# Patient Record
Sex: MALE | Race: WHITE | NOT HISPANIC OR LATINO | Employment: FULL TIME | URBAN - METROPOLITAN AREA
[De-identification: names, ages, dates, MRNs, and addresses within clinical notes are randomized per-mention and may not be internally consistent; named-entity substitution may affect disease eponyms.]

---

## 2023-10-02 ENCOUNTER — OFFICE VISIT (OUTPATIENT)
Dept: FAMILY MEDICINE CLINIC | Facility: CLINIC | Age: 53
End: 2023-10-02

## 2023-10-02 VITALS
RESPIRATION RATE: 18 BRPM | TEMPERATURE: 97.3 F | OXYGEN SATURATION: 98 % | BODY MASS INDEX: 38.41 KG/M2 | HEART RATE: 88 BPM | DIASTOLIC BLOOD PRESSURE: 78 MMHG | SYSTOLIC BLOOD PRESSURE: 128 MMHG | WEIGHT: 283.6 LBS | HEIGHT: 72 IN

## 2023-10-02 DIAGNOSIS — R73.03 PREDIABETES: ICD-10-CM

## 2023-10-02 DIAGNOSIS — R20.0 NUMBNESS AND TINGLING IN BOTH HANDS: Primary | ICD-10-CM

## 2023-10-02 DIAGNOSIS — R20.2 NUMBNESS AND TINGLING OF BOTH FEET: ICD-10-CM

## 2023-10-02 DIAGNOSIS — R20.2 NUMBNESS AND TINGLING IN BOTH HANDS: Primary | ICD-10-CM

## 2023-10-02 DIAGNOSIS — R20.0 NUMBNESS AND TINGLING OF BOTH FEET: ICD-10-CM

## 2023-10-02 LAB
EXPIRATION DATE: ABNORMAL
HBA1C MFR BLD: 6.3 %
Lab: ABNORMAL

## 2023-10-02 NOTE — PROGRESS NOTES
"Subjective   Han Vidales is a 53 y.o. male.     History of Present Illness   Patient presents to [facility] of numbness and tingling in tips of fingers and toes over the past year  Worse on L side   Works as . Does a lot work with hands   Notes all fingers are affected.   Feels off balance with tingling in feet. Feels like moving up feet.     No hx anemia or blood sugar concerns that he is aware of but admits to not seeing a medical provider in several years. Does not currently have health insurance where he is part time. Hoping to go to full time soon     R hand dominant     No significant family hx     80 percent standing at work   Wears steel toed boots at work. Current pair is 3 years old  No issues with fit.     No joint issues. Neck and low back have been feeling okay     Occasionally goes to chiropractor for neck adjustments that help. Been awhile since needed to do this.      The following portions of the patient's history were reviewed and updated as appropriate: allergies, current medications, past family history, past medical history, past social history, past surgical history, and problem list.    Review of Systems  As noted per HPI     Objective   Blood pressure 128/78, pulse 88, temperature 97.3 °F (36.3 °C), resp. rate 18, height 182.9 cm (72\"), weight 129 kg (283 lb 9.6 oz), SpO2 98 %.   Physical Exam  Vitals and nursing note reviewed.   Constitutional:       Appearance: Normal appearance.   HENT:      Head: Normocephalic.      Right Ear: Tympanic membrane, ear canal and external ear normal.      Left Ear: Tympanic membrane, ear canal and external ear normal.      Nose: No congestion or rhinorrhea.      Mouth/Throat:      Pharynx: No oropharyngeal exudate or posterior oropharyngeal erythema.   Cardiovascular:      Rate and Rhythm: Normal rate and regular rhythm.   Pulmonary:      Effort: Pulmonary effort is normal.      Breath sounds: Normal breath sounds.   Musculoskeletal:         " General: No swelling, tenderness or deformity. Normal range of motion.   Skin:     General: Skin is warm and dry.   Neurological:      Mental Status: He is alert and oriented to person, place, and time.   Psychiatric:         Mood and Affect: Mood normal.         Behavior: Behavior normal.         Thought Content: Thought content normal.         Judgment: Judgment normal.       Assessment & Plan   Diagnoses and all orders for this visit:    1. Numbness and tingling in both hands (Primary)  -     POC Glycosylated Hemoglobin (Hb A1C)    2. Numbness and tingling of both feet  -     POC Glycosylated Hemoglobin (Hb A1C)    3. Prediabetes    A1c in office is in prediabetic range. Discussed this with patient. He has been working on cutting back on pop and eating better. This may be contributing to some of his sensation changes. Recommend magnesium and b12 supplement OTC   Pt will follow up if symptoms are not improving or when he has active insurance for further work up .     Coded at a lower level due to lack of medical insurance

## 2024-01-08 ENCOUNTER — NURSE TRIAGE (OUTPATIENT)
Dept: CALL CENTER | Facility: HOSPITAL | Age: 54
End: 2024-01-08
Payer: COMMERCIAL

## 2024-01-08 NOTE — TELEPHONE ENCOUNTER
"Hub-  This  occurred six months - He has had numbness in the hands and feet are cold. He saw the office 6 months ago- He did not have insurance coverage - He has insurance  now-  Conference with the office- Speaking with the office now. Declined triage.   Reason for Disposition   [1] Caller requests to speak ONLY to PCP AND [2] URGENT question    Additional Information   Negative: Lab calling with strep throat test results and triager can call in prescription   Negative: Lab calling with urinalysis test results and triager can call in prescription   Negative: Medication questions   Negative: Medication renewal and refill questions   Negative: Pre-operative or pre-procedural questions   Negative: ED call to PCP (i.e., primary care provider; doctor, NP, or PA)   Negative: Doctor (or NP/PA) call to PCP   Negative: Call about patient who is currently hospitalized   Negative: Lab or radiology calling with CRITICAL test results   Negative: [1] Follow-up call from patient regarding patient's clinical status AND [2] information urgent    Answer Assessment - Initial Assessment Questions  1. REASON FOR CALL or QUESTION: \"What is your reason for calling today?\" or \"How can I best  help you?\" or \"What question do you have that I can help answer?\"      Office visit   2. CALLER: Document the source of call. (e.g., laboratory, patient).      Wife.    Protocols used: PCP Call - No Triage-ADULT-    "

## 2024-01-10 ENCOUNTER — OFFICE VISIT (OUTPATIENT)
Dept: FAMILY MEDICINE CLINIC | Facility: CLINIC | Age: 54
End: 2024-01-10
Payer: COMMERCIAL

## 2024-01-10 VITALS
WEIGHT: 271.6 LBS | HEIGHT: 72 IN | HEART RATE: 78 BPM | TEMPERATURE: 97.5 F | DIASTOLIC BLOOD PRESSURE: 62 MMHG | BODY MASS INDEX: 36.79 KG/M2 | SYSTOLIC BLOOD PRESSURE: 122 MMHG | RESPIRATION RATE: 18 BRPM | OXYGEN SATURATION: 99 %

## 2024-01-10 DIAGNOSIS — Z13.6 ENCOUNTER FOR LIPID SCREENING FOR CARDIOVASCULAR DISEASE: ICD-10-CM

## 2024-01-10 DIAGNOSIS — F17.219 CIGARETTE NICOTINE DEPENDENCE WITH NICOTINE-INDUCED DISORDER: ICD-10-CM

## 2024-01-10 DIAGNOSIS — Z13.220 ENCOUNTER FOR LIPID SCREENING FOR CARDIOVASCULAR DISEASE: ICD-10-CM

## 2024-01-10 DIAGNOSIS — Z12.5 SCREENING FOR MALIGNANT NEOPLASM OF PROSTATE: ICD-10-CM

## 2024-01-10 DIAGNOSIS — R20.0 NUMBNESS AND TINGLING OF BOTH FEET: ICD-10-CM

## 2024-01-10 DIAGNOSIS — M79.10 MYALGIA: ICD-10-CM

## 2024-01-10 DIAGNOSIS — Z00.00 ENCOUNTER FOR WELL ADULT EXAM WITHOUT ABNORMAL FINDINGS: Primary | ICD-10-CM

## 2024-01-10 DIAGNOSIS — R20.0 NUMBNESS AND TINGLING IN BOTH HANDS: ICD-10-CM

## 2024-01-10 DIAGNOSIS — Z11.59 NEED FOR HEPATITIS C SCREENING TEST: ICD-10-CM

## 2024-01-10 DIAGNOSIS — E55.9 VITAMIN D DEFICIENCY: ICD-10-CM

## 2024-01-10 DIAGNOSIS — R20.2 NUMBNESS AND TINGLING OF BOTH FEET: ICD-10-CM

## 2024-01-10 DIAGNOSIS — R20.2 NUMBNESS AND TINGLING IN BOTH HANDS: ICD-10-CM

## 2024-01-10 PROCEDURE — 99396 PREV VISIT EST AGE 40-64: CPT | Performed by: PHYSICIAN ASSISTANT

## 2024-01-15 ENCOUNTER — NURSE TRIAGE (OUTPATIENT)
Dept: CALL CENTER | Facility: HOSPITAL | Age: 54
End: 2024-01-15
Payer: COMMERCIAL

## 2024-01-15 NOTE — TELEPHONE ENCOUNTER
[facility] calling with critical WBC of 112.20. Read back. SALVADOR Márquez on call at [facility] notified of result.  SALVADOR Lyon asks that patient be called and see if he is running a fever.  Recommends going to the ED whether febrile or not.  Patient contacted and denies fevers but will go to the ED as recommended by on call provider.

## 2024-01-15 NOTE — TELEPHONE ENCOUNTER
"Reason for Disposition  • Lab or radiology calling with CRITICAL test results    Additional Information  • Negative: Lab calling with strep throat test results and triager can call in prescription  • Negative: Lab calling with urinalysis test results and triager can call in prescription  • Negative: Medication questions  • Negative: Medication renewal and refill questions  • Negative: Pre-operative or pre-procedural questions  • Negative: ED call to PCP (i.e., primary care provider; doctor, NP, or PA)  • Negative: Doctor (or NP/PA) call to PCP  • Negative: Call about patient who is currently hospitalized    Answer Assessment - Initial Assessment Questions  1. REASON FOR CALL or QUESTION: \"What is your reason for calling today?\" or \"How can I best  help you?\" or \"What question do you have that I can help answer?\"      Critical lab  2. CALLER: Document the source of call. (e.g., laboratory, patient).      [facility]    Protocols used: PCP Call - No Triage-ADULT-    "

## 2024-01-16 ENCOUNTER — OFFICE VISIT (OUTPATIENT)
Dept: FAMILY MEDICINE CLINIC | Facility: CLINIC | Age: 54
End: 2024-01-16
Payer: COMMERCIAL

## 2024-01-16 VITALS
BODY MASS INDEX: 36.35 KG/M2 | HEART RATE: 76 BPM | WEIGHT: 268.4 LBS | HEIGHT: 72 IN | SYSTOLIC BLOOD PRESSURE: 118 MMHG | RESPIRATION RATE: 20 BRPM | OXYGEN SATURATION: 98 % | DIASTOLIC BLOOD PRESSURE: 64 MMHG | TEMPERATURE: 97.8 F

## 2024-01-16 DIAGNOSIS — D72.9 NEUTROPHILIA: ICD-10-CM

## 2024-01-16 DIAGNOSIS — D72.829 LEUKOCYTOSIS, UNSPECIFIED TYPE: Primary | ICD-10-CM

## 2024-01-16 PROCEDURE — 99213 OFFICE O/P EST LOW 20 MIN: CPT | Performed by: PHYSICIAN ASSISTANT

## 2024-01-16 RX ORDER — VITAMIN B COMPLEX
CAPSULE ORAL DAILY
COMMUNITY

## 2024-01-16 RX ORDER — MAGNESIUM OXIDE 400 MG/1
400 TABLET ORAL DAILY
COMMUNITY

## 2024-01-16 RX ORDER — MULTIPLE VITAMINS W/ MINERALS TAB 9MG-400MCG
1 TAB ORAL DAILY
COMMUNITY

## 2024-01-16 RX ORDER — MULTIVIT WITH MINERALS/LUTEIN
250 TABLET ORAL DAILY
COMMUNITY

## 2024-01-16 NOTE — PROGRESS NOTES
"Subjective   Han Vidales is a 53 y.o. male.     History of Present Illness   Pt presents for annual exam   Notes since last visit, he now has health insurance   Would like to proceed with additional evaluation of numbness and tingling in hands and feet  numbness and tingling in tips of fingers and toes over the past year  Worse on L side   Works as . Does a lot work with hands   Notes all fingers are affected.   Feels off balance with tingling in feet. Feels like moving up feet.     Sometimes gets muscle aches in lower legs  Pt's partner concerned about his circulation. Feet will be cold at times      No hx anemia or blood sugar concerns that he is aware of but admits to not seeing a medical provider in several years.      R hand dominant      No significant family hx      80 percent standing at work   Wears steel toed boots at work. Current pair is 3 years old  No issues with fit.      No joint issues. Neck and low back have been feeling okay     Pt current 0.5 ppd smoker     Last visit labs showed he was prediabetic. Directed to work on some nutrition changes to help with blood sugar control     No hx of colon cancer screening. Pt will think about. No known family hx of colon cancer  Does not wish for any updated vaccinations at this time      The following portions of the patient's history were reviewed and updated as appropriate: allergies, current medications, past family history, past medical history, past social history, past surgical history, and problem list.    Review of Systems  As noted per HPI     Objective   Blood pressure 122/62, pulse 78, temperature 97.5 °F (36.4 °C), resp. rate 18, height 182.9 cm (72\"), weight 123 kg (271 lb 9.6 oz), SpO2 99%.     Physical Exam  Vitals and nursing note reviewed.   Constitutional:       Appearance: Normal appearance. He is well-developed.   HENT:      Head: Normocephalic and atraumatic.      Right Ear: Tympanic membrane, ear canal and external ear normal.    "   Left Ear: Tympanic membrane, ear canal and external ear normal.      Nose: Nose normal.      Mouth/Throat:      Pharynx: No oropharyngeal exudate.   Eyes:      Conjunctiva/sclera: Conjunctivae normal.   Neck:      Thyroid: No thyromegaly.      Trachea: No tracheal deviation.   Cardiovascular:      Rate and Rhythm: Normal rate and regular rhythm.      Heart sounds: Normal heart sounds.   Pulmonary:      Effort: Pulmonary effort is normal. No respiratory distress.      Breath sounds: Normal breath sounds. No wheezing or rales.   Chest:      Chest wall: No tenderness.   Abdominal:      General: Bowel sounds are normal. There is no distension.      Palpations: Abdomen is soft. There is no mass.      Tenderness: There is no abdominal tenderness. There is no guarding or rebound.      Hernia: No hernia is present.   Musculoskeletal:      Cervical back: Normal range of motion and neck supple.   Lymphadenopathy:      Cervical: No cervical adenopathy.   Skin:     General: Skin is warm and dry.   Neurological:      Mental Status: He is alert and oriented to person, place, and time.   Psychiatric:         Mood and Affect: Mood normal.         Behavior: Behavior normal.         Thought Content: Thought content normal.         Judgment: Judgment normal.         Assessment & Plan   Diagnoses and all orders for this visit:    1. Encounter for well adult exam without abnormal findings (Primary)  -     CBC w AUTO Differential  -     Comprehensive metabolic panel  -     Iron Profile  -     Vitamin D 25 hydroxy  -     Lipid Panel  -     TSH  -     T4, free  -     Hemoglobin A1c  -     Magnesium  -     Vitamin B12  -     Folate  -     CK  -     NIKOLE  -     Sedimentation rate, automated  -     C-reactive protein  -     Testosterone  -     PSA SCREENING  -     Hepatitis C antibody  -     Lyme Disease Total Antibody With Reflex to Immunoassay    2. Numbness and tingling in both hands  -     CBC w AUTO Differential  -     Comprehensive  metabolic panel  -     Iron Profile  -     TSH  -     T4, free  -     Hemoglobin A1c  -     Magnesium  -     Vitamin B12  -     Folate  -     EMG & Nerve Conduction Test  -     Lyme Disease Total Antibody With Reflex to Immunoassay    3. Numbness and tingling of both feet  -     CBC w AUTO Differential  -     Comprehensive metabolic panel  -     Iron Profile  -     TSH  -     T4, free  -     Hemoglobin A1c  -     Magnesium  -     Vitamin B12  -     Folate  -     EMG & Nerve Conduction Test  -     Lyme Disease Total Antibody With Reflex to Immunoassay    4. Cigarette nicotine dependence with nicotine-induced disorder    5. Myalgia  -     CBC w AUTO Differential  -     Comprehensive metabolic panel  -     CK  -     NIKOLE  -     Sedimentation rate, automated  -     C-reactive protein    6. Vitamin D deficiency  -     Vitamin D 25 hydroxy    7. Encounter for lipid screening for cardiovascular disease  -     Lipid Panel    8. Screening for malignant neoplasm of prostate  -     PSA SCREENING    9. Need for hepatitis C screening test  -     Hepatitis C antibody      Detailed lab panel as outlined in plan   nerve conduction study ordered for ongoing numbness and tingling of upper and lower extremities     Counseling was given to patient and family for the following topics: instructions for management, risk factor reductions, patient and family education, and impressions . Total time of the encounter was 15 minutes and 7.5 minutes was spent counseling.

## 2024-01-16 NOTE — PROGRESS NOTES
"Subjective   Han Vidales is a 53 y.o. male.     History of Present Illness   Pt received phone call from on call provider about elevated WBC count that was critical. WBC was 112.2, neutrophil count 84.15 and absolute basophil count 2.24.   Labs still pending. What could be pulled also noted elevated CRP and sed rate.   Only symptoms have been numbness and tingling in hands and feet for the past year  Pt is current smoker   Due to extremely high white blood cell count, on call provider recommended ER evaluation but patient did not wish to go until he further discussed with provider     The following portions of the patient's history were reviewed and updated as appropriate: allergies, current medications, past family history, past medical history, past social history, past surgical history, and problem list.    Objective   Blood pressure 118/64, pulse 76, temperature 97.8 °F (36.6 °C), resp. rate 20, height 182.9 cm (72\"), weight 122 kg (268 lb 6.4 oz), SpO2 98%.   Physical Exam  Constitutional:       Appearance: Normal appearance.   Cardiovascular:      Rate and Rhythm: Normal rate.   Pulmonary:      Effort: Pulmonary effort is normal.   Neurological:      Mental Status: He is alert and oriented to person, place, and time.   Psychiatric:         Mood and Affect: Mood normal.         Behavior: Behavior normal.         Assessment & Plan   Diagnoses and all orders for this visit:    1. Leukocytosis, unspecified type (Primary)  -     Peripheral Blood Smear  -     Anti-neutrophilic Cytoplasmic Antibody  -     Rheumatoid Factor  -     Hepatitis panel, acute  -     Cortisol  -     Ambulatory Referral to Hematology    2. Neutrophilia  -     Peripheral Blood Smear  -     Anti-neutrophilic Cytoplasmic Antibody  -     Rheumatoid Factor  -     Hepatitis panel, acute  -     Cortisol  -     Ambulatory Referral to Hematology    Check additional lab markers as noted while awaiting on initial labs to return   Urgent referral to " hematology due to significant abnormalities noted on CBC   Etiologies could include inflammation, infection or malignancy   Patient is not showing any evidence these values are from an acute infection.   Advise to report to ER if any new or worrisome symptoms develop while working on getting him evaluated with hematology

## 2024-01-17 ENCOUNTER — TELEPHONE (OUTPATIENT)
Dept: FAMILY MEDICINE CLINIC | Facility: CLINIC | Age: 54
End: 2024-01-17
Payer: COMMERCIAL

## 2024-01-17 LAB
25(OH)D3+25(OH)D2 SERPL-MCNC: 34.9 NG/ML (ref 30–100)
ALBUMIN SERPL-MCNC: 4.7 G/DL (ref 3.5–5.2)
ALBUMIN/GLOB SERPL: 2 G/DL
ALP SERPL-CCNC: 78 U/L (ref 39–117)
ALT SERPL-CCNC: 26 U/L (ref 1–41)
ANA SER QL: POSITIVE
AST SERPL-CCNC: 34 U/L (ref 1–40)
B BURGDOR IGG+IGM SER QL IA: NEGATIVE
BASOPHILS # BLD AUTO: ABNORMAL 10*3/UL
BASOPHILS # BLD MANUAL: 2.24 10*3/MM3 (ref 0–0.2)
BASOPHILS NFR BLD MANUAL: 2 % (ref 0–1.5)
BILIRUB SERPL-MCNC: 0.5 MG/DL (ref 0–1.2)
BUN SERPL-MCNC: 15 MG/DL (ref 6–20)
BUN/CREAT SERPL: 12 (ref 7–25)
CALCIUM SERPL-MCNC: 9.6 MG/DL (ref 8.6–10.5)
CHLORIDE SERPL-SCNC: 101 MMOL/L (ref 98–107)
CHOLEST SERPL-MCNC: 166 MG/DL (ref 0–200)
CK SERPL-CCNC: 85 U/L (ref 20–200)
CO2 SERPL-SCNC: 24.5 MMOL/L (ref 22–29)
CREAT SERPL-MCNC: 1.25 MG/DL (ref 0.76–1.27)
CRP SERPL-MCNC: 0.65 MG/DL (ref 0–0.5)
DIFFERENTIAL COMMENT: ABNORMAL
DSDNA AB SER-ACNC: <1 IU/ML (ref 0–9)
EGFRCR SERPLBLD CKD-EPI 2021: 68.4 ML/MIN/1.73
EOSINOPHIL # BLD AUTO: ABNORMAL 10*3/UL
EOSINOPHIL # BLD MANUAL: 0 10*3/MM3 (ref 0–0.4)
EOSINOPHIL NFR BLD AUTO: ABNORMAL %
EOSINOPHIL NFR BLD MANUAL: 0 % (ref 0.3–6.2)
ERYTHROCYTE [DISTWIDTH] IN BLOOD BY AUTOMATED COUNT: 16.5 % (ref 12.3–15.4)
ERYTHROCYTE [SEDIMENTATION RATE] IN BLOOD BY WESTERGREN METHOD: 39 MM/HR (ref 0–20)
FOLATE SERPL-MCNC: 15.7 NG/ML (ref 4.78–24.2)
GLOBULIN SER CALC-MCNC: 2.3 GM/DL
GLUCOSE SERPL-MCNC: 100 MG/DL (ref 65–99)
HBA1C MFR BLD: 5.9 % (ref 4.8–5.6)
HCT VFR BLD AUTO: 39.5 % (ref 37.5–51)
HCV IGG SERPL QL IA: NON REACTIVE
HDLC SERPL-MCNC: 20 MG/DL (ref 40–60)
HGB BLD-MCNC: 12.7 G/DL (ref 13–17.7)
IRON SATN MFR SERPL: 22 % (ref 20–50)
IRON SERPL-MCNC: 88 MCG/DL (ref 59–158)
LDLC SERPL CALC-MCNC: 93 MG/DL (ref 0–100)
LYMPHOCYTES # BLD AUTO: ABNORMAL 10*3/UL
LYMPHOCYTES # BLD MANUAL: 2.24 10*3/MM3 (ref 0.7–3.1)
LYMPHOCYTES NFR BLD AUTO: ABNORMAL %
LYMPHOCYTES NFR BLD MANUAL: 2 % (ref 19.6–45.3)
Lab: NORMAL
MAGNESIUM SERPL-MCNC: 2.4 MG/DL (ref 1.6–2.6)
MCH RBC QN AUTO: 28.9 PG (ref 26.6–33)
MCHC RBC AUTO-ENTMCNC: 32.2 G/DL (ref 31.5–35.7)
MCV RBC AUTO: 89.8 FL (ref 79–97)
MONOCYTES # BLD MANUAL: 0 10*3/MM3 (ref 0.1–0.9)
MONOCYTES NFR BLD AUTO: ABNORMAL %
MONOCYTES NFR BLD MANUAL: 0 % (ref 5–12)
NEUTROPHILS # BLD MANUAL: 84.15 10*3/MM3 (ref 1.7–7)
NEUTROPHILS NFR BLD AUTO: ABNORMAL %
NEUTROPHILS NFR BLD MANUAL: 75 % (ref 42.7–76)
PLATELET # BLD AUTO: 151 10*3/MM3 (ref 140–450)
PLATELET BLD QL SMEAR: ABNORMAL
POTASSIUM SERPL-SCNC: 4.1 MMOL/L (ref 3.5–5.2)
PROT SERPL-MCNC: 7 G/DL (ref 6–8.5)
PSA SERPL-MCNC: 0.59 NG/ML (ref 0–4)
RBC # BLD AUTO: 4.4 10*6/MM3 (ref 4.14–5.8)
RBC MORPH BLD: ABNORMAL
SODIUM SERPL-SCNC: 139 MMOL/L (ref 136–145)
T4 FREE SERPL-MCNC: 1.15 NG/DL (ref 0.93–1.7)
TESTOST SERPL-MCNC: 382 NG/DL (ref 264–916)
TIBC SERPL-MCNC: 399 MCG/DL
TRIGL SERPL-MCNC: 316 MG/DL (ref 0–150)
TSH SERPL DL<=0.005 MIU/L-ACNC: 1.95 UIU/ML (ref 0.27–4.2)
UIBC SERPL-MCNC: 311 MCG/DL (ref 112–346)
VIT B12 SERPL-MCNC: >2000 PG/ML (ref 211–946)
VLDLC SERPL CALC-MCNC: 53 MG/DL (ref 5–40)
WBC # BLD AUTO: 112.2 10*3/MM3 (ref 3.4–10.8)

## 2024-01-18 LAB
BASOPHILS # BLD AUTO: 3.3 X10E3/UL (ref 0–0.2)
BASOPHILS NFR BLD AUTO: 3 %
BLASTS NFR BLD: 1 % (ref 0–0)
C-ANCA TITR SER IF: ABNORMAL TITER
CORTIS SERPL-MCNC: 7.1 UG/DL (ref 6.2–19.4)
EOSINOPHIL # BLD AUTO: 0 X10E3/UL (ref 0–0.4)
EOSINOPHIL NFR BLD AUTO: 0 %
ERYTHROCYTE [DISTWIDTH] IN BLOOD BY AUTOMATED COUNT: 17.2 % (ref 11.6–15.4)
HAV IGM SERPL QL IA: NEGATIVE
HBV CORE IGM SERPL QL IA: NEGATIVE
HBV SURFACE AG SERPL QL IA: NEGATIVE
HCT VFR BLD AUTO: 41.5 % (ref 37.5–51)
HCV AB SERPL QL IA: NORMAL
HCV IGG SERPL QL IA: NON REACTIVE
HGB BLD-MCNC: 12.9 G/DL (ref 13–17.7)
IMMATURE CELLS: ABNORMAL
LYMPHOCYTES # BLD AUTO: 6.6 X10E3/UL (ref 0.7–3.1)
LYMPHOCYTES NFR BLD AUTO: 6 %
MCH RBC QN AUTO: 27.9 PG (ref 26.6–33)
MCHC RBC AUTO-ENTMCNC: 31.1 G/DL (ref 31.5–35.7)
MCV RBC AUTO: 90 FL (ref 79–97)
METAMYELOCYTES NFR BLD: 5 % (ref 0–0)
MONOCYTES # BLD AUTO: 3.3 X10E3/UL (ref 0.1–0.9)
MONOCYTES NFR BLD AUTO: 3 %
MORPHOLOGY BLD-IMP: ABNORMAL
MYELOCYTES NFR BLD: 14 % (ref 0–0)
NEUTROPHILS # BLD AUTO: 67.7 X10E3/UL (ref 1.4–7)
NEUTROPHILS NFR BLD AUTO: 62 %
NRBC BLD AUTO-RTO: 1 % (ref 0–0)
P-ANCA ATYPICAL TITR SER IF: ABNORMAL TITER
P-ANCA TITR SER IF: ABNORMAL TITER
PATH INTERP BLD-IMP: ABNORMAL
PATH REV BLD -IMP: ABNORMAL
PATHOLOGIST NAME: ABNORMAL
PLATELET # BLD AUTO: 138 X10E3/UL (ref 150–450)
PROMYELOCYTES NFR BLD: 6 % (ref 0–0)
RBC # BLD AUTO: 4.62 X10E6/UL (ref 4.14–5.8)
RHEUMATOID FACT SERPL-ACNC: <10 IU/ML
WBC # BLD AUTO: 109.2 X10E3/UL (ref 3.4–10.8)

## 2024-01-19 ENCOUNTER — DOCUMENTATION (OUTPATIENT)
Dept: FAMILY MEDICINE CLINIC | Facility: CLINIC | Age: 54
End: 2024-01-19
Payer: COMMERCIAL

## 2024-01-19 DIAGNOSIS — I77.6 AUTOIMMUNE VASCULITIS: Primary | ICD-10-CM
